# Patient Record
Sex: FEMALE | Race: WHITE | Employment: OTHER | URBAN - METROPOLITAN AREA
[De-identification: names, ages, dates, MRNs, and addresses within clinical notes are randomized per-mention and may not be internally consistent; named-entity substitution may affect disease eponyms.]

---

## 2020-11-19 ENCOUNTER — TRANSCRIBE ORDER (OUTPATIENT)
Dept: SCHEDULING | Age: 70
End: 2020-11-19

## 2020-11-19 DIAGNOSIS — S93.525S: Primary | ICD-10-CM

## 2020-11-25 ENCOUNTER — HOSPITAL ENCOUNTER (OUTPATIENT)
Dept: MRI IMAGING | Age: 70
Discharge: HOME | End: 2020-11-25
Attending: PODIATRIST
Payer: MEDICARE

## 2020-11-25 DIAGNOSIS — S93.525S: ICD-10-CM

## 2020-11-25 PROCEDURE — 73718 MRI LOWER EXTREMITY W/O DYE: CPT

## 2021-01-28 ENCOUNTER — HOSPITAL ENCOUNTER (OUTPATIENT)
Age: 71
Setting detail: OUTPATIENT SURGERY
End: 2021-01-28
Attending: PODIATRIST | Admitting: PODIATRIST
Payer: MEDICARE

## 2021-01-31 ENCOUNTER — HOSPITAL ENCOUNTER (OUTPATIENT)
Dept: LAB | Age: 71
Discharge: HOME | End: 2021-01-31
Payer: MEDICARE

## 2021-01-31 DIAGNOSIS — Z01.818 PRE-OP TESTING: ICD-10-CM

## 2021-01-31 PROCEDURE — U0003 INFECTIOUS AGENT DETECTION BY NUCLEIC ACID (DNA OR RNA); SEVERE ACUTE RESPIRATORY SYNDROME CORONAVIRUS 2 (SARS-COV-2) (CORONAVIRUS DISEASE [COVID-19]), AMPLIFIED PROBE TECHNIQUE, MAKING USE OF HIGH THROUGHPUT TECHNOLOGIES AS DESCRIBED BY CMS-2020-01-R: HCPCS

## 2021-02-01 ENCOUNTER — HOSPITAL ENCOUNTER (OUTPATIENT)
Dept: PREADMISSION TESTING | Age: 71
Discharge: HOME | End: 2021-02-01

## 2021-02-02 VITALS — WEIGHT: 172 LBS | BODY MASS INDEX: 30.48 KG/M2 | HEIGHT: 63 IN

## 2021-02-02 LAB — SARS-COV-2, COV2NT: NOT DETECTED

## 2021-02-02 RX ORDER — MULTIVIT,MIN/FOLIC ACID/HRB157 400 MCG
1 TABLET ORAL DAILY
COMMUNITY

## 2021-02-02 RX ORDER — LOSARTAN POTASSIUM AND HYDROCHLOROTHIAZIDE 25; 100 MG/1; MG/1
0.5 TABLET ORAL DAILY
COMMUNITY

## 2021-02-02 NOTE — PERIOP NOTES
[address] [facility] [address], [address] [facility]   MAIN OR                                  [phone]   MAIN PRE OP                          [phone]                                                                                AMBULATORY PRE OP          [phone]  PRE-ADMISSION TESTING    [phone]   Surgery Date:  02/04/2021       Is surgery arrival time given by surgeon? YES    If NO, [facility] you between 3 and 7pm the day before your surgery with your arrival time. (If your surgery is on a Monday, we will call you the Friday before.)    Call [phone] after 7pm Monday-Friday if you did not receive this call. INSTRUCTIONS BEFORE YOUR SURGERY   When You  Arrive Arrive at the [address] [facility] on the day of your surgery  Have your insurance card, photo ID, and any copayment (if needed)   Food   and   Drink NO food or drink after midnight the night before surgery    This means NO water, gum, mints, coffee, juice, etc.  No alcohol (beer, wine, liquor) 24 hours before and after surgery   Medications to   TAKE   Morning of Surgery MEDICATIONS TO TAKE THE MORNING OF SURGERY WITH A SIP OF WATER:    Losartan/hxtz   Medications  To  STOP      7 days before surgery  Non-Steroidal anti-inflammatory Drugs (NSAID's): for example, Ibuprofen (Advil, Motrin), Naproxen (Aleve)   Aspirin, if taking for pain    Herbal supplements, vitamins, and fish oil   Other:  (Pain medications not listed above, including Tylenol may be taken)   Blood  Thinners  If you take  Aspirin, Plavix, Coumadin, or any blood-thinning or anti-blood clot medicine, talk to the doctor who prescribed the medications for pre-operative instructions.    Bathing Clothing  Jewelry  Valuables      If you shower the morning of surgery, please do not apply anything to your skin (lotions, powders, deodorant, or makeup, especially mascara)   Follow Chlorhexidine Care Fusion body wash instructions provided to you during PAT appointment. Begin 3 days prior to surgery.  Do not shave or trim anywhere 24 hours before surgery   Wear your hair loose or down; no pony-tails, buns, or metal hair clips   Wear loose, comfortable, clean clothes   Wear glasses instead of contacts   Leave money, valuables, and jewelry, including body piercings, at home   Going Home - or Spending the Night  SAME-DAY SURGERY: You must have a responsible adult drive you home and stay with you 24 hours after surgery   ADMITS: If your doctor is keeping you in the hospital after surgery, leave personal belongings/luggage in your car until you have a hospital room number. Hospital discharge time is 12 noon  Drivers must be here before 12 noon unless you are told differently   Special Instructions Pt is seeing surgeon tomorrow and will see about chlor wipes before surgery. Follow all instructions so your surgery wont be cancelled. Please, be on time. If a situation occurs and you are delayed the day of surgery, call [phone] or [phone]. If your physical condition changes (like a fever, cold, flu, etc.) call your surgeon. Home medication(s) reviewed and verified via   PHONE   LIST   VERBAL   during PAT appointment. The patient was contacted by  PHONE    IN-PERSON  The patient verbalizes understanding of all instructions and   DOES   DOES NOT   need reinforcement.

## 2021-02-03 RX ORDER — LIDOCAINE HYDROCHLORIDE 10 MG/ML
0.1 INJECTION, SOLUTION EPIDURAL; INFILTRATION; INTRACAUDAL; PERINEURAL AS NEEDED
Status: CANCELLED | OUTPATIENT
Start: 2021-02-03

## 2021-02-03 RX ORDER — DIPHENHYDRAMINE HYDROCHLORIDE 50 MG/ML
12.5 INJECTION, SOLUTION INTRAMUSCULAR; INTRAVENOUS AS NEEDED
Status: CANCELLED | OUTPATIENT
Start: 2021-02-03 | End: 2021-02-03

## 2021-02-03 RX ORDER — SODIUM CHLORIDE, SODIUM LACTATE, POTASSIUM CHLORIDE, CALCIUM CHLORIDE 600; 310; 30; 20 MG/100ML; MG/100ML; MG/100ML; MG/100ML
125 INJECTION, SOLUTION INTRAVENOUS CONTINUOUS
Status: CANCELLED | OUTPATIENT
Start: 2021-02-03

## 2021-02-03 RX ORDER — SODIUM CHLORIDE, SODIUM LACTATE, POTASSIUM CHLORIDE, CALCIUM CHLORIDE 600; 310; 30; 20 MG/100ML; MG/100ML; MG/100ML; MG/100ML
125 INJECTION, SOLUTION INTRAVENOUS CONTINUOUS
Status: CANCELLED | OUTPATIENT
Start: 2021-02-03 | End: 2021-02-04

## 2021-02-03 RX ORDER — FLUMAZENIL 0.1 MG/ML
0.2 INJECTION INTRAVENOUS
Status: CANCELLED | OUTPATIENT
Start: 2021-02-03

## 2021-02-03 RX ORDER — NALOXONE HYDROCHLORIDE 0.4 MG/ML
0.2 INJECTION, SOLUTION INTRAMUSCULAR; INTRAVENOUS; SUBCUTANEOUS
Status: CANCELLED | OUTPATIENT
Start: 2021-02-03

## 2021-02-03 RX ORDER — HYDROMORPHONE HYDROCHLORIDE 1 MG/ML
.25-1 INJECTION, SOLUTION INTRAMUSCULAR; INTRAVENOUS; SUBCUTANEOUS
Status: CANCELLED | OUTPATIENT
Start: 2021-02-03

## 2021-02-14 ENCOUNTER — HOSPITAL ENCOUNTER (OUTPATIENT)
Dept: PREADMISSION TESTING | Age: 71
Discharge: HOME | End: 2021-02-14
Payer: MEDICARE

## 2021-02-14 PROCEDURE — U0003 INFECTIOUS AGENT DETECTION BY NUCLEIC ACID (DNA OR RNA); SEVERE ACUTE RESPIRATORY SYNDROME CORONAVIRUS 2 (SARS-COV-2) (CORONAVIRUS DISEASE [COVID-19]), AMPLIFIED PROBE TECHNIQUE, MAKING USE OF HIGH THROUGHPUT TECHNOLOGIES AS DESCRIBED BY CMS-2020-01-R: HCPCS

## 2021-02-16 LAB — SARS-COV-2, COV2NT: NOT DETECTED

## 2021-02-17 ENCOUNTER — ANESTHESIA EVENT (OUTPATIENT)
Dept: SURGERY | Age: 71
End: 2021-02-17
Payer: MEDICARE

## 2021-02-18 ENCOUNTER — ANESTHESIA (OUTPATIENT)
Dept: SURGERY | Age: 71
End: 2021-02-18
Payer: MEDICARE

## 2021-02-18 ENCOUNTER — HOSPITAL ENCOUNTER (OUTPATIENT)
Age: 71
Setting detail: OUTPATIENT SURGERY
Discharge: HOME | End: 2021-02-18
Attending: PODIATRIST | Admitting: PODIATRIST
Payer: MEDICARE

## 2021-02-18 ENCOUNTER — HOSPITAL ENCOUNTER (OUTPATIENT)
Dept: GENERAL RADIOLOGY | Age: 71
Discharge: HOME | End: 2021-02-18
Attending: PODIATRIST

## 2021-02-18 VITALS
BODY MASS INDEX: 31.76 KG/M2 | OXYGEN SATURATION: 96 % | DIASTOLIC BLOOD PRESSURE: 56 MMHG | WEIGHT: 179.23 LBS | HEIGHT: 63 IN | SYSTOLIC BLOOD PRESSURE: 115 MMHG | RESPIRATION RATE: 11 BRPM | HEART RATE: 83 BPM | TEMPERATURE: 98.3 F

## 2021-02-18 PROBLEM — M20.42 HAMMERTOE OF SECOND TOE OF LEFT FOOT: Status: ACTIVE | Noted: 2021-02-18

## 2021-02-18 PROBLEM — M62.40 CONTRACTURE OF TENDON SHEATH: Status: ACTIVE | Noted: 2021-02-18

## 2021-02-18 PROBLEM — M77.42 METATARSALGIA OF LEFT FOOT: Status: ACTIVE | Noted: 2021-02-18

## 2021-02-18 LAB
ANION GAP SERPL CALC-SCNC: 7 MMOL/L (ref 5–15)
BUN SERPL-MCNC: 23 MG/DL (ref 6–20)
BUN/CREAT SERPL: 23 (ref 12–20)
CALCIUM SERPL-MCNC: 9 MG/DL (ref 8.5–10.1)
CHLORIDE SERPL-SCNC: 104 MMOL/L (ref 97–108)
CO2 SERPL-SCNC: 26 MMOL/L (ref 21–32)
CREAT SERPL-MCNC: 0.99 MG/DL (ref 0.55–1.02)
GLUCOSE SERPL-MCNC: 114 MG/DL (ref 65–100)
POTASSIUM SERPL-SCNC: 3.9 MMOL/L (ref 3.5–5.1)
SODIUM SERPL-SCNC: 137 MMOL/L (ref 136–145)

## 2021-02-18 PROCEDURE — 76060000063 HC AMB SURG ANES 1.5 TO 2 HR: Performed by: PODIATRIST

## 2021-02-18 PROCEDURE — 36415 COLL VENOUS BLD VENIPUNCTURE: CPT

## 2021-02-18 PROCEDURE — 74011250636 HC RX REV CODE- 250/636: Performed by: PODIATRIST

## 2021-02-18 PROCEDURE — 77030031139 HC SUT VCRL2 J&J -A: Performed by: PODIATRIST

## 2021-02-18 PROCEDURE — 77030000032 HC CUF TRNQT ZIMM -B: Performed by: PODIATRIST

## 2021-02-18 PROCEDURE — 77030002933 HC SUT MCRYL J&J -A: Performed by: PODIATRIST

## 2021-02-18 PROCEDURE — 77030040361 HC SLV COMPR DVT MDII -B

## 2021-02-18 PROCEDURE — 76030000003 HC AMB SURG OR TIME 1.5 TO 2: Performed by: PODIATRIST

## 2021-02-18 PROCEDURE — 77030013079 HC BLNKT BAIR HGGR 3M -A: Performed by: ANESTHESIOLOGY

## 2021-02-18 PROCEDURE — 77030020268 HC MISC GENERAL SUPPLY: Performed by: PODIATRIST

## 2021-02-18 PROCEDURE — 77030002916 HC SUT ETHLN J&J -A: Performed by: PODIATRIST

## 2021-02-18 PROCEDURE — 77030020138

## 2021-02-18 PROCEDURE — 76210000057 HC AMBSU PH II REC 1 TO 1.5 HR: Performed by: PODIATRIST

## 2021-02-18 PROCEDURE — 77030040922 HC BLNKT HYPOTHRM STRY -A

## 2021-02-18 PROCEDURE — 80048 BASIC METABOLIC PNL TOTAL CA: CPT

## 2021-02-18 PROCEDURE — 74011000250 HC RX REV CODE- 250: Performed by: PODIATRIST

## 2021-02-18 PROCEDURE — 74011000250 HC RX REV CODE- 250: Performed by: ANESTHESIOLOGY

## 2021-02-18 PROCEDURE — 2709999900 HC NON-CHARGEABLE SUPPLY: Performed by: PODIATRIST

## 2021-02-18 PROCEDURE — 74011250636 HC RX REV CODE- 250/636: Performed by: NURSE ANESTHETIST, CERTIFIED REGISTERED

## 2021-02-18 PROCEDURE — 74011000250 HC RX REV CODE- 250: Performed by: NURSE ANESTHETIST, CERTIFIED REGISTERED

## 2021-02-18 PROCEDURE — 74011250636 HC RX REV CODE- 250/636: Performed by: ANESTHESIOLOGY

## 2021-02-18 PROCEDURE — C1713 ANCHOR/SCREW BN/BN,TIS/BN: HCPCS | Performed by: PODIATRIST

## 2021-02-18 DEVICE — IMPLANTABLE DEVICE: Type: IMPLANTABLE DEVICE | Site: TOE | Status: FUNCTIONAL

## 2021-02-18 DEVICE — SCREW BNE L11MM DIA2MM MAG CORT FT ANK TI SELF DRL ST SLD: Type: IMPLANTABLE DEVICE | Site: TOE | Status: FUNCTIONAL

## 2021-02-18 RX ORDER — HYDROMORPHONE HYDROCHLORIDE 1 MG/ML
.25-1 INJECTION, SOLUTION INTRAMUSCULAR; INTRAVENOUS; SUBCUTANEOUS
Status: DISCONTINUED | OUTPATIENT
Start: 2021-02-18 | End: 2021-02-18 | Stop reason: HOSPADM

## 2021-02-18 RX ORDER — MIDAZOLAM HYDROCHLORIDE 1 MG/ML
INJECTION, SOLUTION INTRAMUSCULAR; INTRAVENOUS AS NEEDED
Status: DISCONTINUED | OUTPATIENT
Start: 2021-02-18 | End: 2021-02-18 | Stop reason: HOSPADM

## 2021-02-18 RX ORDER — ONDANSETRON 2 MG/ML
INJECTION INTRAMUSCULAR; INTRAVENOUS AS NEEDED
Status: DISCONTINUED | OUTPATIENT
Start: 2021-02-18 | End: 2021-02-18 | Stop reason: HOSPADM

## 2021-02-18 RX ORDER — LIDOCAINE HYDROCHLORIDE 10 MG/ML
0.1 INJECTION, SOLUTION EPIDURAL; INFILTRATION; INTRACAUDAL; PERINEURAL AS NEEDED
Status: DISCONTINUED | OUTPATIENT
Start: 2021-02-18 | End: 2021-02-18 | Stop reason: HOSPADM

## 2021-02-18 RX ORDER — DEXAMETHASONE SODIUM PHOSPHATE 100 MG/10ML
INJECTION INTRAMUSCULAR; INTRAVENOUS AS NEEDED
Status: DISCONTINUED | OUTPATIENT
Start: 2021-02-18 | End: 2021-02-18 | Stop reason: HOSPADM

## 2021-02-18 RX ORDER — FENTANYL CITRATE 50 UG/ML
INJECTION, SOLUTION INTRAMUSCULAR; INTRAVENOUS AS NEEDED
Status: DISCONTINUED | OUTPATIENT
Start: 2021-02-18 | End: 2021-02-18 | Stop reason: HOSPADM

## 2021-02-18 RX ORDER — FLUMAZENIL 0.1 MG/ML
0.2 INJECTION INTRAVENOUS
Status: DISCONTINUED | OUTPATIENT
Start: 2021-02-18 | End: 2021-02-18 | Stop reason: HOSPADM

## 2021-02-18 RX ORDER — NALOXONE HYDROCHLORIDE 0.4 MG/ML
0.2 INJECTION, SOLUTION INTRAMUSCULAR; INTRAVENOUS; SUBCUTANEOUS
Status: DISCONTINUED | OUTPATIENT
Start: 2021-02-18 | End: 2021-02-18 | Stop reason: HOSPADM

## 2021-02-18 RX ORDER — EPHEDRINE SULFATE/0.9% NACL/PF 50 MG/5 ML
SYRINGE (ML) INTRAVENOUS AS NEEDED
Status: DISCONTINUED | OUTPATIENT
Start: 2021-02-18 | End: 2021-02-18 | Stop reason: HOSPADM

## 2021-02-18 RX ORDER — SODIUM CHLORIDE, SODIUM LACTATE, POTASSIUM CHLORIDE, CALCIUM CHLORIDE 600; 310; 30; 20 MG/100ML; MG/100ML; MG/100ML; MG/100ML
125 INJECTION, SOLUTION INTRAVENOUS CONTINUOUS
Status: DISCONTINUED | OUTPATIENT
Start: 2021-02-18 | End: 2021-02-18 | Stop reason: HOSPADM

## 2021-02-18 RX ORDER — DIPHENHYDRAMINE HYDROCHLORIDE 50 MG/ML
12.5 INJECTION, SOLUTION INTRAMUSCULAR; INTRAVENOUS AS NEEDED
Status: DISCONTINUED | OUTPATIENT
Start: 2021-02-18 | End: 2021-02-18 | Stop reason: HOSPADM

## 2021-02-18 RX ORDER — BUPIVACAINE HYDROCHLORIDE 5 MG/ML
INJECTION, SOLUTION EPIDURAL; INTRACAUDAL AS NEEDED
Status: DISCONTINUED | OUTPATIENT
Start: 2021-02-18 | End: 2021-02-18 | Stop reason: HOSPADM

## 2021-02-18 RX ORDER — PROPOFOL 10 MG/ML
INJECTION, EMULSION INTRAVENOUS
Status: DISCONTINUED | OUTPATIENT
Start: 2021-02-18 | End: 2021-02-18 | Stop reason: HOSPADM

## 2021-02-18 RX ADMIN — ONDANSETRON HYDROCHLORIDE 4 MG: 2 SOLUTION INTRAMUSCULAR; INTRAVENOUS at 08:47

## 2021-02-18 RX ADMIN — FENTANYL CITRATE 50 MCG: 0.05 INJECTION, SOLUTION INTRAMUSCULAR; INTRAVENOUS at 07:36

## 2021-02-18 RX ADMIN — CEFAZOLIN SODIUM 2 G: 1 POWDER, FOR SOLUTION INTRAMUSCULAR; INTRAVENOUS at 07:44

## 2021-02-18 RX ADMIN — Medication 10 MG: at 07:53

## 2021-02-18 RX ADMIN — FENTANYL CITRATE 50 MCG: 0.05 INJECTION, SOLUTION INTRAMUSCULAR; INTRAVENOUS at 08:15

## 2021-02-18 RX ADMIN — ONDANSETRON HYDROCHLORIDE 4 MG: 2 SOLUTION INTRAMUSCULAR; INTRAVENOUS at 07:45

## 2021-02-18 RX ADMIN — MIDAZOLAM HYDROCHLORIDE 2 MG: 2 INJECTION, SOLUTION INTRAMUSCULAR; INTRAVENOUS at 07:30

## 2021-02-18 RX ADMIN — SODIUM CHLORIDE, POTASSIUM CHLORIDE, SODIUM LACTATE AND CALCIUM CHLORIDE 125 ML/HR: 600; 310; 30; 20 INJECTION, SOLUTION INTRAVENOUS at 06:43

## 2021-02-18 RX ADMIN — PROPOFOL 100 MCG/KG/MIN: 10 INJECTION, EMULSION INTRAVENOUS at 07:41

## 2021-02-18 RX ADMIN — DEXAMETHASONE SODIUM PHOSPHATE 8 MG: 10 INJECTION INTRAMUSCULAR; INTRAVENOUS at 07:45

## 2021-02-18 NOTE — PERIOP NOTES
Patient's  contacted by phone at [address] [place] [address] to notify of closing, per surgeon's order.

## 2021-02-18 NOTE — DISCHARGE INSTRUCTIONS
Post-Operative Instructions:    Patient Name: Bryn Faith  Patient MRN: [id number]  : 1950  Discharged Date: 2021      MEDICATIONS:   -If you experience nausea, take anti-nausea medication   -Take pain medication regularly for first 24 hours. Then take as needed for pain.     -Often anti-nausea medication helps relieve pain due to it's mild sedative effect.   -Constipation can be a common side effect when taking narcotic pain medications, take precautions to avoid this:    -Drink plenty of fluids    -Eat plenty of fiber    -Avoid caffeine and dairy products    -Take stool softener if needed (Colace/Dulcolax)    DIET:   -Eat light foods for first meal today (ie: dry cereal/toast/crackers, clear fluids). -If tolerated first meal, then can eat normally at second meal.    ACTIVITY:   -Relative BED REST for first 72 hours (only getting to bathroom, bedroom, kitchen)   -Keep surgical shoe/boot on at all times (even when sleeping)   -Elevate surgical site at all times (at least 6 inches above hip level)   -Apply ice pack to just above surgical site (NOT directly on the site), 10 mins  on and 20 mins off for the first 72 hours.   -Weight-bearing: {Weight bearing restriction:90857}    DRESSINGS/SHOWER:   -Keep dressing clean, dry, and intact at all times.   -Reinforce dressing as needed, but DO NOT remove dressing!! EMERGENCY:   -Call office ([phone]) or on all pager after hours ([phone]) if. ..    -bandages become wet or heavy drainage/bleeding noted    -medications are not helping your pain    -you injure or bump the surgical site    -you have fever over 101.5 degrees    FOLLOW-UP:   -Make sure you follow-up with your doctor within 1 week of surgery.    -Call office ([phone]) if you need to schedule appointment      Patients signature:  Date: 2021  Discharging Nurse:    Physician: Emir Tran, DPM                DISCHARGE SUMMARY from your Nurse      PATIENT INSTRUCTIONS    After general anesthesia or intravenous sedation, for 24 hours or while taking prescription Narcotics:  · Limit your activities  · Do not drive and operate hazardous machinery  · Do not make important personal or business decisions  · Do  not drink alcoholic beverages  · If you have not urinated within 8 hours after discharge, please contact your surgeon on call. Report the following to your surgeon:  · Excessive pain, swelling, redness or odor of or around the surgical area  · Temperature over 100.5  · Nausea and vomiting lasting longer than 4 hours or if unable to take medications  · Any signs of decreased circulation or nerve impairment to extremity: change in color, persistent  numbness, tingling, coldness or increase pain  · Any questions      GOOD HELP TO FIGHT AN INFECTION  Here are a few tip to help reduce the chance of getting an infection after surgery:   Wash Your Hands   Good handwashing is the most important thing you and your caregiver can do.  Wash before and after caring for any wounds. Dry your hand with a clean towel.  Wash with soap and water for at least 20 seconds. A TIP: sing the \"Happy Birthday\" song through one time while washing to help with the timing.  Use a hand  in between washings.  Shower   When your surgeon says it is OK to take a shower, use a new bar of antibacterial soap (if that is what you use, and keep that bar of soap ONLY for your use), or antibacterial body wash.  Use a clean wash cloth or sponge when you bathe.  Dry off with a clean towel  after every bath - be careful around any wounds, skin staples, sutures or surgical glue over/on wounds.  Do not enter swimming pools, hot tubs, lakes, rivers and/or ocean until wounds are healed and your doctor/surgeon says it is OK.  Use Clean Sheets   Sleep on freshly laundered sheets after your surgery.    Keep the surgery site covered with a clean, dry bandage (if instructed to do so).  If the bandage becomes soiled, reapply a new, dry, clean bandage.  Do not allow pets to sleep with you while your wound is healing.  Lifestyle Modification and Controlling Your Blood Sugar   Smoking slows wound healing. Stop smoking and limit exposure to second-hand smoke.  High blood sugar slows wound healing. Eat a well-balanced diet to provide proper nutrition while healing   Monitor your blood sugar (if you are a diabetic) and take your medications as you are suppose to so you can control you blood sugar after surgery. COUGH AND DEEP BREATHE    Breathing deeply and coughing are very important exercises to do after surgery. Deep breathing and coughing open the little air tubes and air sacks in your lungs. You take deep breaths every day. You may not even notice - it is just something you do when you sigh or yawn. It is a natural exercise you do to keep these air passages open. After surgery, take deep breaths and cough, on purpose. DIRECTIONS:  · Take 10 to 15 slow deep breaths every hour while awake. · Breathe in deeply, and hold it for 2 seconds. · Exhale slowly through puckered lips, like blowing up a balloon. · After every 4th or 5th deep breath, hug your pillow to your chest or belly and give a hard, deep cough. Yes, it will probably hurt. But doing this exercise is a very important part of healing after surgery. Take your pain medicine to help you do this exercise without too much pain. Coughing and deep breathing help prevent bronchitis and pneumonia after surgery. If you had chest or belly surgery, use a pillow as a \"hug gila\" and hold it tightly to your chest or belly when you cough. ANKLE PUMPS    Ankle pumps increase the circulation of oxygenated blood to your lower extremities and decrease your risk for circulation problems such as blood clots.  They also stretch the muscles, tendons and ligaments in your foot and ankle, and prevent joint contracture in the ankle and foot, especially after surgeries on the legs. It is important to do ankle pump exercises regularly after surgery because immobility increases your risk for developing a blood clot. Your doctor may also have you take an Aspirin for the next few days as well. If your doctor did not ask you to take an Aspirin, consult with him before starting Aspirin therapy on your own. The exercise is quite simple. · Slowly point your foot forward, feeling the muscles on the top of your lower leg stretch, and hold this position for 5 seconds. · Next, pull your foot back toward you as far as possible, stretching the calf muscles, and hold that position for 5 seconds. · Repeat with the other foot. · Perform 10 repetitions every hour while awake for both ankles if possible (down and then up with the foot once is one repetition). You should feel gentle stretching of the muscles in your lower leg when doing this exercise. If you feel pain, or your range of motion is limited, don't push too hard. Only go the limit your joint and muscles will let you go. If you have increasing pain, progressively worsening leg warmth or swelling, STOP the exercise and call your doctor. MEDICATION AND   SIDE EFFECT GUIDE    The [facility] MEDICATION AND SIDE EFFECT GUIDE was provided to the PATIENT AND CARE PROVIDER. Information provided includes instruction about drug purpose and common side effects for the following medications:   · Wear surgical shoe  · Full weight on heel only  · Crutches balance         These are general instructions for a healthy lifestyle:    *   Please give a list of your current medications to your Primary Care Provider. *   Please update this list whenever your medications are discontinued, doses are changed, or new medications (including over-the-counter products) are added.   *   Please carry medication information at all times in case of emergency situations. About Smoking  No smoking / No tobacco products  Avoid exposure to second hand smoke     Surgeon General's Warning:  Quitting smoking now greatly reduces serious risk to your health. Obesity, smoking, and sedentary lifestyle greatly increases your risk for illness and disease. A healthy diet, regular physical exercise & weight monitoring are important for maintaining a healthy lifestyle. Congestive Heart Failure  You may be retaining fluid if you have a history of heart failure or if you experience any of the following symptoms:  Weight gain of 3 pounds or more overnight or 5 pounds in a week, increased swelling in your hands or feet or shortness of breath while lying flat in bed. Please call your doctor as soon as you notice any of these symptoms; do not wait until your next office visit. Recognize signs and symptoms of STROKE:  F -  Face looks uneven  A -  Arms unable to move or move evenly  S -  Speech slurred or non-existent  T -  Time-call 911 as soon as signs and symptoms begin-DO NOT go          back to bed or wait to see if you get better-TIME IS BRAIN. Warning Signs of HEART ATTACK   Call 911 if you have these symptoms:     Chest discomfort. Most heart attacks involve discomfort in the center of the chest that lasts more than a few minutes, or that goes away and comes back. It can feel like uncomfortable pressure, squeezing, fullness, or pain.  Discomfort in other areas of the upper body. Symptoms can include pain or discomfort in one or both arms, the back, neck, jaw, or stomach.  Shortness of breath with or without chest discomfort.  Other signs may include breaking out in a cold sweat, nausea, or lightheadedness. Don't wait more than five minutes to call 911 - MINUTES MATTER! Fast action can save your life. Calling 911 is almost always the fastest way to get lifesaving treatment.  Emergency Medical Services staff can begin treatment when they arrive -- up to an hour sooner than if someone gets to the hospital by car. Learning About Coronavirus [phone])  Coronavirus [phone]): Overview  What is coronavirus (COVID-19)? The coronavirus disease (COVID-19) is caused by a virus. It is an illness that was first found in [place], [place], in December 2019. It has since spread worldwide. The virus can cause fever, cough, and trouble breathing. In severe cases, it can cause pneumonia and make it hard to breathe without help. It can cause death. Coronaviruses are a large group of viruses. They cause the common cold. They also cause more serious illnesses like Middle East respiratory syndrome (MERS) and severe acute respiratory syndrome (SARS). COVID-19 is caused by a novel coronavirus. That means it's a new type that has not been seen in people before. This virus spreads person-to-person through droplets from coughing and sneezing. It can also spread when you are close to someone who is infected. And it can spread when you touch something that has the virus on it, such as a doorknob or a tabletop. What can you do to protect yourself from coronavirus (COVID-19)? The best way to protect yourself from getting sick is to:  · Avoid areas where there is an outbreak. · Avoid contact with people who may be infected. · Wash your hands often with soap or alcohol-based hand sanitizers. · Avoid crowds and try to stay at least 6 feet away from other people. · Wash your hands often, especially after you cough or sneeze. Use soap and water, and scrub for at least 20 seconds. If soap and water aren't available, use an alcohol-based hand . · Avoid touching your mouth, nose, and eyes. What can you do to avoid spreading the virus to others? To help avoid spreading the virus to others:  · Cover your mouth with a tissue when you cough or sneeze. Then throw the tissue in the trash. · Use a disinfectant to clean things that you touch often.   · Stay home if you are sick or have been exposed to the virus. Don't go to school, work, or public areas. And don't use public transportation. · If you are sick:  ? Leave your home only if you need to get medical care. But call the doctor's office first so they know you're coming. And wear a face mask, if you have one.  ? If you have a face mask, wear it whenever you're around other people. It can help stop the spread of the virus when you cough or sneeze. ? Clean and disinfect your home every day. Use household  and disinfectant wipes or sprays. Take special care to clean things that you grab with your hands. These include doorknobs, remote controls, phones, and handles on your refrigerator and microwave. And don't forget countertops, tabletops, bathrooms, and computer keyboards. When to call for help  Call 911 anytime you think you may need emergency care. For example, call if:  · You have severe trouble breathing. (You can't talk at all.)  · You have constant chest pain or pressure. · You are severely dizzy or lightheaded. · You are confused or can't think clearly. · Your face and lips have a blue color. · You pass out (lose consciousness) or are very hard to wake up. Call your doctor now if you develop symptoms such as:  · Shortness of breath. · Fever. · Cough. If you need to get care, call ahead to the doctor's office for instructions before you go. Make sure you wear a face mask, if you have one, to prevent exposing other people to the virus. Where can you get the latest information? The following health organizations are tracking and studying this virus. Their websites contain the most up-to-date information. Jingita Dakin also learn what to do if you think you may have been exposed to the virus. · U.S. Centers for Disease Control and Prevention (CDC): The CDC provides updated news about the disease and travel advice. The website also tells you how to prevent the spread of infection.  www.cdc.gov  · World Health Organization [facility]): WHO offers information about the virus outbreaks. WHO also has travel advice. www.who.int  Current as of: April 1, 2020               Content Version: 12.4  © 2006-2020 [employer]. Care instructions adapted under license by your healthcare professional. If you have questions about a medical condition or this instruction, always ask your healthcare professional. [address] any warranty or liability for your use of this information. The discharge information has been reviewed with the {PATIENT PARENT GUARDIAN:67835}. Any questions and concerns from the {PATIENT PARENT GUARDIAN:66358} have been addressed. The {PATIENT PARENT GUARDIAN:42941} verbalized understanding. The following personal items collected during your admission are returned to you:   Dental Appliance: Dental Appliances: None  Vision:    Hearing Aid:    Jewelry: Jewelry: None  Clothing: Clothing: Shirt, Undergarments, Footwear, Pants  Other Valuables:  Other Valuables: None  Valuables sent to safe:

## 2021-02-18 NOTE — BRIEF OP NOTE
Brief Postoperative Note    Patient: Humera Huggins  YOB: 1950  MRN: [id number]    Date of Procedure: 2/18/2021     Pre-Op Diagnosis: ELONGATED AND PLANTAR FLEXED 2ND METATARSAL  2ND HAMMERTOE DEFORMITY  CONTRACTURE 2ND EXTENSOR TENDON    Post-Op Diagnosis: Same as preoperative diagnosis. Procedure(s):  CORRECTION OF ELONGATED AND PLANTAR FLEXED 2ND METATARSAL WITH WEIL OSTEOTOMY AND SCREW FIXATION,  2ND HAMMERTOE CORRECTION WITH OSTEOTOMY, TENOTOMY AND CAPSULOTOMY 2ND METATARSAL PHALANGEAL JOINT LEFT FOOT    Surgeon(s):  Balwinder Owens DPM    Surgical Assistant: Surg Asst-1: Mindy Ellis I    Anesthesia: MAC     Estimated Blood Loss (mL): Minimal    Complications: None    Specimens: * No specimens in log *     Implants:   Implant Name Type Inv. Item Serial No.  Lot No. LRB No. Used Action   GRAFT HUMAN TISSUE REGENERATIVE MATRIX CYGNUS - [id number]  GRAFT HUMAN TISSUE REGENERATIVE MATRIX CYGNUS [id number] [id number] Left 1 Implanted   SCR QUICKFIX MAG 2X11MM --  - SNA  SCR QUICKFIX MAG 2X11MM --  NA [employer] NA Left 1 Implanted       Drains: * No LDAs found *    Findings: Hypertrophic bone, contracted soft tissues.     Electronically Signed by Ousmane Rosenberg DPM on 2/18/2021 at 9:53 AM

## 2021-02-18 NOTE — OP NOTES
[facility]mund [facility] 80  OPERATIVE REPORT    Name:  Lucio Clark  MR#:  [id number]  :  1950  ACCOUNT #:  [de-identified]  DATE OF SERVICE:  2021    PREOPERATIVE DIAGNOSES:  1.  Plantar flexed and elongated second metatarsal bone of the left foot. 2.  Hammertoe deformity of the second digit of the left foot. 3.  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 POSTOPERATIVE DIAGNOSES:  1.  Plantar flexed and elongated second metatarsal bone of the left foot. 2.  Hammertoe deformity of the second digit of the left foot. 3.  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 PROCEDURES PERFORMED:  1. Weil second metatarsal osteotomy and screw fixation. 2.  Hammertoe correction of the second toe with osteotomy. 3.  Tenotomy and capsulotomy of the second metatarsophalangeal joint, all of the left foot. SURGEON:  Jordan Mcghee DPM    ASSISTANT:  None. ANESTHESIA:  MAC. ANESTHESIOLOGIST:  Teto Serrato MD    COMPLICATIONS:  None. SPECIMENS REMOVED:  None. IMPLANTS:  One 2 mm fully-threaded cortical screw from Arthrex, a 3 cm x 4 cm graft amniotic soft tissue-derived graft from Vivex. ESTIMATED BLOOD LOSS:  Less than 5 mL. FINDINGS:  Hypertrophic bone and contracted soft tissues. INDICATIONS:  The patient is a 71-year-old white female who presented to the office with a chronically painful second metatarsal, second metatarsophalangeal joint and second toe on the left foot. The patient had undergone an extensive course of nonsurgical care in the form of physical therapy. She had received injections from her primary care physician. She had undergone a course in changing her shoes and orthoses, all to no symptomatic effect. The patient had all treatment options reviewed with her and her .   The patient was made aware of all risks, benefits, alternatives and potential complications of surgical management including but not limited to need for additional surgery, failure of the procedure to achieve desired results, swelling, stiffness, scarring, numbness, nerve damage, failure of fixation, over correction, under correction, pain in other areas of the foot, and potential of the surgery not providing any relief and in fact making her condition worse and not better, potential of infection of the soft tissue and/or bone. The patient was also made aware of the fact that coming to the hospital would increase her risks of being exposed to the COVID-19 virus and that if she did contract the virus that she would potentially have a worse course having undergone anesthesia. The patient had the consent reviewed, understood and signed and elected for surgical management of this condition. PROCEDURE:  The patient was taken to the operating room, placed on the operating room table in the supine position. The patient had a well-padded pneumatic ankle tourniquet applied to the left ankle. The patient had received 2 g of Ancef for preoperative prophylactic antibiotics. The patient's left foot prepped and draped in normal sterile fashion. The patient received 10 mL of 1% lidocaine infiltrated in the surgical site. The left foot was prepped and draped in normal sterile fashion and the pneumatic ankle tourniquet was elevated to 250 mmHg and the procedure began. Procedure #1 was correction of hammertoe of the second digit of the left foot. Attention was directed towards the second toe of the left foot where a contracture was noted at the metatarsophalangeal joint, proximal interphalangeal joint as well. The proximal phalanx was in a dorsiflexed position and the middle phalanx in the plantar flexed position. A [place] blade was utilized to make a small skin incision at the plantar aspect of the second toe of the left foot.   The incision was deepened down to the level of subcutaneous tissue taking care to appropriately retract and ligate all neurovascular structures as necessary. Then through a blunt and sharp dissection, the incision was deepened down to the level of the base of the second toe of the left foot. This was confirmed under intraoperative C-arm guidance. Then using the side-cutting hernandez from the Arthrex set, a plantarflexion-based wedge of bone was cut into the base of the proximal phalanx with the Arthrex side-cutting hernandez under intraoperative C-arm guidance. The osteotomy site was then plantarflexed to decrease the dorsiflexion contracture. Site was copiously flushed and irrigated. Next, attention was directed towards the dorsal aspect of the middle phalanx of the second toe of the left foot where an incision was made dorsally. The incision was deepened down to the level of subcutaneous tissue taking care to appropriately retract and ligate all neurovascular structures as necessary. Then with blunt and sharp dissection, incision deepened down to the level of the extensor tendon, was identified and retracted. Then using the side-cutting hernandez from the Arthrex set once again, an osteotomy cut was made in order to dorsiflex the middle phalanx in the upward position. This helped reduced the deformity of the second toe. There was noted to be residual contracture at the second metatarsophalangeal joint. Procedure #2 was tenotomy, capsulotomy of the second metatarsophalangeal joint. A curvilinear incision was made over the second metatarsophalangeal joint approximately 2.5 cm in length. The incision was deepened down to the level of the subcutaneous tissue taking care to appropriately retract and ligate all neurovascular structures as necessary. Then through both blunt and sharp dissection, the incision was deepened down to the level of the extensor tendon, was identified and just proximal to the joint, was transversely tenotomized.   The dorsal aspect of the second metatarsophalangeal joint capsule was noted to be contracted. Then with the use of a #15 blade, a transverse capsulotomy was performed. There was noted to be residual contracture, so the McGlamry elevator was utilized to fully reduce the contracture of the second metatarsophalangeal joint. Care was taken not to injure the cartilage of the second metatarsophalangeal joint. The second ray deformity was reduced. There was noted to be elongation of the second metatarsal as well as plantar flexion and there was residual varus deformity of the second toe of the left foot. Therefore, a Weil osteotomy was decided upon. Procedure #3 was correction of elongated and plantarflexed second metatarsal with a Weil osteotomy with screw fixation. The incision was deepened down to the level of the periosteum of the second metatarsal where a T-shaped capsular and periosteal incision was made. The soft tissue attachments were elevated off of the head and neck of the second metatarsal.  There was noted to be significant hypertrophic bone at the site where patient had chronic pain. The hypertrophic bone was denuded with the use of a rongeur and then smoothed to a fine surface rather with a handheld bone rasp. The site was copiously flushed and irrigated. Then using the oscillating saw, a Weil osteotomy cut was made into the second metatarsal head. The capital fragment then was transposed proximally and slightly laterally helping to reduce the varus alignment of the second toe and to shorten and dorsiflex the second metatarsal.  The site was held temporarily with a 0.045 K-wire. Then under intraoperative C-arm guidance, one 2 mm fully-threaded Arthrex set twist-off screw was put in place. Then temporary fixation of the K-wire was removed and then x-rays were taken intraoperatively that confirmed excellent reduction of preoperative deformities.   Sites were copiously flushed and irrigated and the amniotic-derived soft tissue graft from Vivex was put in place in the second metatarsophalangeal joint and then the periosteum was closed. The graft was then put in the subcutaneous tissue at the other incision sites. Deep closure was obtained with the use of 3-0 Vicryl in interrupted simple suture fashion. Subcutaneous tissue was closed at all sites with the use of 3-0 Monocryl in interrupted simple suture fashion. Skin was closed with the use of 4-0 nylon in interrupted simple suture fashion. The pneumatic ankle tourniquet was deflated. Note that the capillary refilling time was immediate to all digits of the left foot. The patient had Xeroform, 4x4s, Layne and an Ace bandage applied to the left foot. Postoperatively, the patient was stable. The patient tolerated the procedure well without complication and will be followed up as an outpatient.       ANKITA [id number]  D:  02/18/2021 14:14  T:  02/18/2021 16:58  JOB #:  [id number]

## 2021-02-18 NOTE — ANESTHESIA PREPROCEDURE EVALUATION
Relevant Problems   No relevant active problems       Anesthetic History   No history of anesthetic complications            Review of Systems / Medical History  Patient summary reviewed, nursing notes reviewed and pertinent labs reviewed    Pulmonary  Within defined limits                 Neuro/Psych   Within defined limits           Cardiovascular  Within defined limits  Hypertension                Comments: Not on Beta Blocker   GI/Hepatic/Renal  Within defined limits              Endo/Other  Within defined limits    Hypothyroidism       Other Findings              Physical Exam    Airway  Mallampati: II  TM Distance: 4 - 6 cm  Neck ROM: normal range of motion   Mouth opening: Normal     Cardiovascular  Regular rate and rhythm,  S1 and S2 normal,  no murmur, click, rub, or gallop  Rhythm: regular  Rate: normal         Dental  No notable dental hx       Pulmonary  Breath sounds clear to auscultation               Abdominal  GI exam deferred       Other Findings            Anesthetic Plan    ASA: 2  Anesthesia type: MAC            Anesthetic plan and risks discussed with: Patient

## 2021-02-18 NOTE — ANESTHESIA POSTPROCEDURE EVALUATION
Procedure(s):  CORRECTION OF ELONGATED AND PLANTAR FLEXED 2ND METATARSAL WITH WEIL OSTEOTOMY AND SCREW FIXATION,  2ND HAMMERTOE CORRECTION WITH OSTEOTOMY, TENOTOMY AND CAPSULOTOMY 2ND METATARSAL PHALANGEAL JOINT LEFT FOOT.     MAC    Anesthesia Post Evaluation      Multimodal analgesia: multimodal analgesia used between 6 hours prior to anesthesia start to PACU discharge  Patient location during evaluation: bedside  Patient participation: complete - patient participated  Level of consciousness: awake  Pain management: adequate  Airway patency: patent  Anesthetic complications: no  Cardiovascular status: acceptable  Respiratory status: acceptable  Hydration status: acceptable        INITIAL Post-op Vital signs:   Vitals Value Taken Time   /59 02/18/21 0945   Temp 36.8 °C (98.3 °F) 02/18/21 0945   Pulse 81 02/18/21 0945   Resp 14 02/18/21 0945   SpO2 96 % 02/18/21 0945

## 2022-03-18 PROBLEM — M77.42 METATARSALGIA OF LEFT FOOT: Status: ACTIVE | Noted: 2021-02-18

## 2022-03-19 PROBLEM — M20.42 HAMMERTOE OF SECOND TOE OF LEFT FOOT: Status: ACTIVE | Noted: 2021-02-18

## 2022-03-20 PROBLEM — M62.40 CONTRACTURE OF TENDON SHEATH: Status: ACTIVE | Noted: 2021-02-18

## 2022-09-20 ENCOUNTER — TRANSCRIBE ORDER (OUTPATIENT)
Dept: REGISTRATION | Age: 72
End: 2022-09-20

## 2022-09-20 DIAGNOSIS — Z01.818 PRE-OP TESTING: Primary | ICD-10-CM

## 2023-01-21 ENCOUNTER — APPOINTMENT (OUTPATIENT)
Dept: GENERAL RADIOLOGY | Age: 73
End: 2023-01-21
Attending: NURSE PRACTITIONER
Payer: MEDICARE

## 2023-01-21 ENCOUNTER — HOSPITAL ENCOUNTER (EMERGENCY)
Age: 73
Discharge: HOME | End: 2023-01-21
Attending: EMERGENCY MEDICINE
Payer: MEDICARE

## 2023-01-21 VITALS
BODY MASS INDEX: 28.35 KG/M2 | DIASTOLIC BLOOD PRESSURE: 88 MMHG | WEIGHT: 160 LBS | HEIGHT: 63 IN | RESPIRATION RATE: 16 BRPM | OXYGEN SATURATION: 98 % | SYSTOLIC BLOOD PRESSURE: 185 MMHG | TEMPERATURE: 98 F | HEART RATE: 83 BPM

## 2023-01-21 DIAGNOSIS — S89.91XA KNEE INJURY, RIGHT, INITIAL ENCOUNTER: Primary | ICD-10-CM

## 2023-01-21 PROCEDURE — 74011250637 HC RX REV CODE- 250/637: Performed by: NURSE PRACTITIONER

## 2023-01-21 PROCEDURE — 73562 X-RAY EXAM OF KNEE 3: CPT

## 2023-01-21 PROCEDURE — 99283 EMERGENCY DEPT VISIT LOW MDM: CPT

## 2023-01-21 RX ORDER — TRAMADOL HYDROCHLORIDE 50 MG/1
50 TABLET ORAL
Status: COMPLETED | OUTPATIENT
Start: 2023-01-21 | End: 2023-01-21

## 2023-01-21 RX ORDER — TRAMADOL HYDROCHLORIDE 50 MG/1
50 TABLET ORAL
Qty: 12 TABLET | Refills: 0 | Status: SHIPPED | OUTPATIENT
Start: 2023-01-21 | End: 2023-01-24

## 2023-01-21 RX ADMIN — TRAMADOL HYDROCHLORIDE 50 MG: 50 TABLET ORAL at 14:45

## 2023-01-21 NOTE — ED TRIAGE NOTES
Patient arrives to the ED with complaints of right knee pain after trying to get into the car this morning. Patient reports increased pain upon movement. Denies tingling or numbness to lower extremity.

## 2023-01-21 NOTE — ED PROVIDER NOTES
This is a 69-year-old female who presents to the emergency room with complaints of acute onset right knee pain after trying to get out of the car this morning and hitting her foot on a concrete step causing immediate pain to the right knee. Patient states she has prior history of pain in the right knee and recently had a cortisone injection which improved the pain dramatically. Today as she was trying to get out of the car she struck her foot, reinjured her knee due to the force and has been unable to weight-bear due to the pain. Denies falling, hitting her head, loss of consciousness. Has not taken any medication prior to arrival for her symptoms. There are no further complaints at this time    Mika Martínez MD  Past Medical History:  No date: Hypertension  No date: Thyroid disease  Past Surgical History:  No date: HX  SECTION      Comment:  x3           Past Medical History:   Diagnosis Date    Hypertension     Thyroid disease        Past Surgical History:   Procedure Laterality Date    HX  SECTION      x3         No family history on file. Social History     Socioeconomic History    Marital status:      Spouse name: Not on file    Number of children: Not on file    Years of education: Not on file    Highest education level: Not on file   Occupational History    Not on file   Tobacco Use    Smoking status: Former     Types: Cigarettes     Quit date:      Years since quittin.0    Smokeless tobacco: Never   Vaping Use    Vaping Use: Never used   Substance and Sexual Activity    Alcohol use:  Yes     Alcohol/week: 2.0 standard drinks     Types: 2 Glasses of wine per week     Comment: occassionally    Drug use: Never    Sexual activity: Not on file   Other Topics Concern    Not on file   Social History Narrative    Not on file     Social Determinants of Health     Financial Resource Strain: Not on file   Food Insecurity: Not on file   Transportation Needs: Not on file   Physical Activity: Not on file   Stress: Not on file   Social Connections: Not on file   Intimate Partner Violence: Not on file   Housing Stability: Not on file         ALLERGIES: Patient has no known allergies. Review of Systems   Constitutional:  Positive for activity change (pain on weightbearing). Negative for appetite change and fatigue. HENT:  Negative for congestion. Eyes:  Negative for pain. Respiratory:  Negative for shortness of breath. Cardiovascular:  Negative for chest pain. Gastrointestinal:  Negative for abdominal distention and abdominal pain. Genitourinary:  Negative for difficulty urinating. Musculoskeletal:  Positive for arthralgias (right knee pain). Negative for neck pain and neck stiffness. Skin:  Negative for color change. Neurological:  Negative for weakness and headaches. Hematological:  Does not bruise/bleed easily. Psychiatric/Behavioral:  The patient is not nervous/anxious. Vitals:    01/21/23 1140   BP: (!) 185/88   Pulse: 83   Resp: 16   Temp: 98 °F (36.7 °C)   SpO2: 98%   Weight: 72.6 kg (160 lb)   Height: 5' 3\" (1.6 m)            Physical Exam  Vitals and nursing note reviewed. Constitutional:       General: She is not in acute distress. Appearance: Normal appearance. She is well-developed. She is not ill-appearing. HENT:      Head: Normocephalic and atraumatic. Right Ear: External ear normal.      Left Ear: External ear normal.      Nose: Nose normal. No congestion. Mouth/Throat:      Mouth: Mucous membranes are moist.   Eyes:      General:         Right eye: No discharge. Left eye: No discharge. Conjunctiva/sclera: Conjunctivae normal.      Pupils: Pupils are equal, round, and reactive to light. Neck:      Vascular: No JVD. Trachea: No tracheal deviation. Cardiovascular:      Rate and Rhythm: Normal rate and regular rhythm. Pulses: Normal pulses. Heart sounds: Normal heart sounds. No murmur heard. No gallop. Pulmonary:      Effort: Pulmonary effort is normal. No respiratory distress. Breath sounds: Normal breath sounds. Chest:      Chest wall: No tenderness. Abdominal:      General: Bowel sounds are normal. There is no distension. Palpations: Abdomen is soft. Tenderness: There is no abdominal tenderness. There is no guarding or rebound. Genitourinary:     Comments: Negative    Musculoskeletal:         General: Tenderness (left knee with pain, no swelling, no deformity.) present. Normal range of motion. Cervical back: Normal range of motion and neck supple. No tenderness. Skin:     General: Skin is warm and dry. Capillary Refill: Capillary refill takes less than 2 seconds. Coloration: Skin is not pale. Findings: No erythema or rash. Neurological:      General: No focal deficit present. Mental Status: She is alert and oriented to person, place, and time. Motor: No weakness. Coordination: Coordination normal.      Comments: Positive palpable pulses, no neurological deficits   Psychiatric:         Mood and Affect: Mood normal.         Behavior: Behavior normal.         Thought Content: Thought content normal.         Judgment: Judgment normal.        Medical Decision Making  Differential diagnosis includes patellar dislocation, ligament tear, tibial plateau fracture and others. This is a 69-year-old female who presents to the emergency room with complaints of right knee pain. Patient hit her right foot on a piece of cement and with the force caused pain to her right knee. Right knee has been previously injured and she got a dose of cortisone as an outpatient in the recent past that helped returns to the emergency room today with worsening pain due to the force of her foot hitting the cement. Patient having a difficult time weightbearing. After initial assessment the following was completed:    1.  Previous notes (external to Emergency room) were reviewed chart review with orthopedics notes. 2.History was obtained by patient and wife. 3. Chronic medical issues affecting this visit:    Past Medical History:  No date: Hypertension  No date: Thyroid disease  Past Surgical History:  No date: HX  SECTION      Comment:  x3      3. I ordered the following tests, interpreted them independently followed by review of final reads by lab and radiology: knee plain film    4. I considered ordering CT scan     5. Any intervention/ surgery needed 76-year-old female with right knee pain after hitting a concrete sidewalk causing her knee to jolt forward. After physical assessment and review of imaging, patient was placed in a knee immobilizer, given crutches and was discharged home to follow-up with PCP as well as Dr. Jersey Beck. Will return to the emergency room with worsening symptoms. No indication for surgical intervention at this time. 6. Social determinants of health none    7 Final diagnosis right knee injury. Medications prescribed: Tramadol    8. Disposition discharged home and follow-up with PCP. Follow-up with orthopedics as an outpatient. Return to the emergency room with worsening symptoms. Patient in agreement with plan of care. Problems Addressed:  Knee injury, right, initial encounter: acute illness or injury    Amount and/or Complexity of Data Reviewed  Independent Historian: spouse  External Data Reviewed: notes. Details: orthopedics  Radiology: ordered. Risk  Prescription drug management. Labs Reviewed - No data to display  XR KNEE RT 3 V    Result Date: 2023  No acute abnormality. 1505:  Pt has been reexamined. Pt has no new complaints, changes or physical findings. Care plan outlined and precautions discussed. All available results were reviewed with pt. All medications were reviewed with pt. All of pt's questions and concerns were addressed.  Pt agrees to F/U as instructed and agrees to return to ED upon further deterioration. Pt is ready to go home. Ny Mar NP    Please note that this dictation was completed with [employer], the computer voice recognition software. Quite often unanticipated grammatical, syntax, homophones, and other interpretive errors are inadvertently transcribed by the computer software. Please disregard these errors. Please excuse any errors that have escaped final proofreading. Thank you.       Procedures

## 2023-04-20 ENCOUNTER — HOSPITAL ENCOUNTER (OUTPATIENT)
Dept: CT IMAGING | Age: 73
Discharge: HOME | End: 2023-04-20
Attending: PHYSICIAN ASSISTANT
Payer: MEDICARE

## 2023-04-20 DIAGNOSIS — G89.29 CHRONIC PAIN OF RIGHT KNEE: ICD-10-CM

## 2023-04-20 DIAGNOSIS — M25.561 CHRONIC PAIN OF RIGHT KNEE: ICD-10-CM

## 2023-04-20 DIAGNOSIS — M17.11 PRIMARY OSTEOARTHRITIS OF RIGHT KNEE: ICD-10-CM

## 2023-04-20 PROCEDURE — 73700 CT LOWER EXTREMITY W/O DYE: CPT

## (undated) DEVICE — SUTURE MCRYL SZ 4-0 L27IN ABSRB UD L19MM PS-2 1/2 CIR PRIM Y426H

## (undated) DEVICE — STERILE POLYISOPRENE POWDER-FREE SURGICAL GLOVES: Brand: PROTEXIS

## (undated) DEVICE — SUTURE ETHLN SZ 4-0 L18IN NONABSORBABLE BLK L19MM PS-2 3/8 1667H

## (undated) DEVICE — BANDAGE,GAUZE,CONFORMING,3"X75",STRL,LF: Brand: MEDLINE

## (undated) DEVICE — BNDG ELAS HK LOOP 4X5YD NS -- MATRIX

## (undated) DEVICE — COVER LT HNDL PLAS RIG 1 PER PK

## (undated) DEVICE — DRAPE C-ARM OEC ELIT MINVIEW -- WITH PLATE PROTECTOR

## (undated) DEVICE — STRAP,POSITIONING,KNEE/BODY,FOAM,4X60": Brand: MEDLINE

## (undated) DEVICE — CANISTER, RIGID, 3000CC: Brand: MEDLINE INDUSTRIES, INC.

## (undated) DEVICE — SYR 10ML LUER LOK 1/5ML GRAD --

## (undated) DEVICE — NEEDLE HYPO 25GA L1.5IN BVL ORIENTED ECLIPSE

## (undated) DEVICE — INTENDED FOR TISSUE SEPARATION, AND OTHER PROCEDURES THAT REQUIRE A SHARP SURGICAL BLADE TO PUNCTURE OR CUT.: Brand: BARD-PARKER ® CARBON RIB-BACK BLADES

## (undated) DEVICE — DRAPE,EXTREMITY,89X128,STERILE: Brand: MEDLINE

## (undated) DEVICE — SOL IRR SOD CL 0.9% 1000ML BTL --

## (undated) DEVICE — Device

## (undated) DEVICE — TUBING SUCT 10FR MAL ALUM SHFT FN CAP VENT UNIV CONN W/ OBT

## (undated) DEVICE — STANDARD SURGICAL GOWN, L: Brand: CONVERTORS

## (undated) DEVICE — BANDAGE COBAN 4 IN COMPR W4INXL5YD FOAM COHESIVE QUIK STK SELF ADH SFT

## (undated) DEVICE — BANDAGE,ELASTIC,ESMARK,STERILE,4"X9',LF: Brand: MEDLINE

## (undated) DEVICE — REM POLYHESIVE ADULT PATIENT RETURN ELECTRODE: Brand: VALLEYLAB

## (undated) DEVICE — DRESSING,GAUZE,XEROFORM,CURAD,1"X8",ST: Brand: CURAD

## (undated) DEVICE — ZIMMER® STERILE DISPOSABLE TOURNIQUET CUFF WITH PROTECTIVE SLEEVE AND PLC, DUAL PORT, SINGLE BLADDER, 18 IN. (46 CM)

## (undated) DEVICE — SUTURE VCRL SZ 3-0 L27IN ABSRB UD L26MM SH 1/2 CIR J416H

## (undated) DEVICE — TUBING, SUCTION, 1/4" X 10', STRAIGHT: Brand: MEDLINE

## (undated) DEVICE — DRAPE SHT 3 QTR PROXIMA 53X77 --

## (undated) DEVICE — SYRINGE IRRIG 60ML SFT PLIABLE BLB EZ TO GRP 1 HND USE W/

## (undated) DEVICE — STERILE POLYISOPRENE POWDER-FREE SURGICAL GLOVES WITH EMOLLIENT COATING: Brand: PROTEXIS

## (undated) DEVICE — SUTURE VCRL SZ 2-0 L27IN ABSRB UD L26MM SH 1/2 CIR J417H

## (undated) DEVICE — SPONGE GZ W4XL4IN COT 12 PLY TYP VII WVN C FLD DSGN

## (undated) DEVICE — DRAPE C ARM W54XL84IN MINI FOR OEC 6800

## (undated) DEVICE — PREP SKN CHLRAPRP APL 26ML STR --

## (undated) DEVICE — SUTURE MCRYL SZ 3-0 L27IN ABSRB UD L26MM SH 1/2 CIR Y416H